# Patient Record
Sex: FEMALE | Race: AMERICAN INDIAN OR ALASKA NATIVE
[De-identification: names, ages, dates, MRNs, and addresses within clinical notes are randomized per-mention and may not be internally consistent; named-entity substitution may affect disease eponyms.]

---

## 2017-12-05 ENCOUNTER — HOSPITAL ENCOUNTER (OUTPATIENT)
Dept: HOSPITAL 5 - MAMMO | Age: 52
Discharge: HOME | End: 2017-12-05
Attending: SPECIALIST
Payer: COMMERCIAL

## 2017-12-05 DIAGNOSIS — Z12.31: Primary | ICD-10-CM

## 2017-12-05 PROCEDURE — 77067 SCR MAMMO BI INCL CAD: CPT

## 2017-12-05 PROCEDURE — G0202 SCR MAMMO BI INCL CAD: HCPCS

## 2017-12-05 NOTE — MAMMOGRAPHY REPORT
Bilateral digital screening mammogram with CAD. Comparison study is 

dated June 14, 2016.



Findings: There is heterogeneous density of the fibroglandular tissue. 

In the central left breast, there is an ill-defined new parenchymal 

asymmetry. No architectural distortion or suspicious calcifications are 

seen.



There is also a small central asymmetry in the right breast.



Impression: Bilateral asymmetries.



BI-RADS code: 0.



Recommendation: Spot compression images, 90 degree views, and 

ultrasound if needed.

## 2017-12-12 ENCOUNTER — HOSPITAL ENCOUNTER (OUTPATIENT)
Dept: HOSPITAL 5 - MAMMO | Age: 52
Discharge: HOME | End: 2017-12-12
Attending: SPECIALIST
Payer: COMMERCIAL

## 2017-12-12 DIAGNOSIS — N60.01: Primary | ICD-10-CM

## 2017-12-12 PROCEDURE — G0204 DX MAMMO INCL CAD BI: HCPCS

## 2017-12-12 PROCEDURE — 76642 ULTRASOUND BREAST LIMITED: CPT

## 2017-12-12 PROCEDURE — 77066 DX MAMMO INCL CAD BI: CPT

## 2017-12-12 NOTE — MAMMOGRAPHY REPORT
BILATERAL DIGITAL DIAGNOSTIC MAMMOGRAM and RIGHT BREAST ULTRASOUND: 

12/12/17 10:56:00



CLINICAL: Recalled for bilateral asymmetries.



COMPARISON:12/05/17 screening



FINDINGS: Bilateral true lateral and spot compression views were 

performed. Satisfactory effacement of left asymmetry and partial 

effacement of a right central asymmetry. The lateral views are 

negative. 



Ultrasound of the right breast was performed and demonstrated an 

elongated cyst at 12 o'clock 6 cm from the nipple measuring 8 x 2 x 2 

mm and a second benign cyst at 12 o'clock 6 cm from the nipple 

measuring 10 x 3 x 3 mm.  No solid mass or shadowing.



IMPRESSION: Small benign right breast cysts.  Negative left breast.



BI-RADS CATEGORY:  2 - - Benign



RECOMMENDATION: Routine mammographic screening in one year.



ACR BI-RADS MAMMOGRAPHIC CODES:

0 = Needs additional imaging evaluation; 1 = Negative; 2 = Benign; 3 = 

Probably benign; 4 = Suspicious; 5 = Malignant; 6 = Known biopsy-proven 

malignancy



COMMENT:

      1.   Dense breast tissue, i.e., adenosis, fibrocystic 

            changes, etc., may obscure an underlying neoplasm.

      2.   Approximately 10% of cancers are not detected with

            mammography.

      3.   A negative mammography report should not delay biopsy 

            if a clinically suspicious mass is present.





COMMENT:

Patient follow-up letters are generated via our [employer] 

application.

## 2019-02-05 ENCOUNTER — HOSPITAL ENCOUNTER (OUTPATIENT)
Dept: HOSPITAL 5 - MAMMO | Age: 54
Discharge: HOME | End: 2019-02-05
Attending: INTERNAL MEDICINE
Payer: COMMERCIAL

## 2019-02-05 DIAGNOSIS — Z12.31: Primary | ICD-10-CM

## 2019-02-05 DIAGNOSIS — I10: ICD-10-CM

## 2019-02-05 PROCEDURE — 77067 SCR MAMMO BI INCL CAD: CPT

## 2019-02-07 NOTE — MAMMOGRAPHY REPORT
BILATERAL DIGITAL SCREENING MAMMOGRAM with CAD: 02/05/19 09:06:00



CLINICAL: Routine screening.



COMPARISON:12/12/17



FINDINGS: There are scattered areas of fibroglandular density. No mass, 

architectural distortion or suspicious calcifications.



IMPRESSION: No mammographic evidence of malignancy.



BI-RADS CATEGORY:  2 -- Benign



RECOMMENDATION: Routine mammographic screening in one year.



COMMENT:

Patient follow-up letters are generated by our  [employer] application.

## 2019-03-25 NOTE — ULTRASOUND REPORT
BILATERAL DIGITAL DIAGNOSTIC MAMMOGRAM and RIGHT BREAST ULTRASOUND: 

12/12/17 10:56:00



CLINICAL: Recalled for bilateral asymmetries.



COMPARISON:12/05/17 screening



FINDINGS: Bilateral true lateral and spot compression views were 

performed. Satisfactory effacement of left asymmetry and partial 

effacement of a right central asymmetry. The lateral views are 

negative. 



Ultrasound of the right breast was performed and demonstrated an 

elongated cyst at 12 o'clock 6 cm from the nipple measuring 8 x 2 x 2 

mm and a second benign cyst at 12 o'clock 6 cm from the nipple 

measuring 10 x 3 x 3 mm.  No solid mass or shadowing.



IMPRESSION: Small benign right breast cysts.  Negative left breast.



BI-RADS CATEGORY:  2 - - Benign



RECOMMENDATION: Routine mammographic screening in one year.



ACR BI-RADS MAMMOGRAPHIC CODES:

0 = Needs additional imaging evaluation; 1 = Negative; 2 = Benign; 3 = 

Probably benign; 4 = Suspicious; 5 = Malignant; 6 = Known biopsy-proven 

malignancy



COMMENT:

      1.   Dense breast tissue, i.e., adenosis, fibrocystic 

            changes, etc., may obscure an underlying neoplasm.

      2.   Approximately 10% of cancers are not detected with

            mammography.

      3.   A negative mammography report should not delay biopsy 

            if a clinically suspicious mass is present.





COMMENT:

Patient follow-up letters are generated via our [employer] 

application.
Implemented All Universal Safety Interventions:  [place] to call system. Call bell, personal items and telephone within reach. Instruct patient to call for assistance. Room bathroom lighting operational. Non-slip footwear when patient is off stretcher. Physically safe environment: no spills, clutter or unnecessary equipment. Stretcher in lowest position, wheels locked, appropriate side rails in place.

## 2021-05-18 ENCOUNTER — HOSPITAL ENCOUNTER (OUTPATIENT)
Dept: HOSPITAL 5 - MAMMO | Age: 56
Discharge: HOME | End: 2021-05-18
Attending: SPECIALIST
Payer: COMMERCIAL

## 2021-05-18 DIAGNOSIS — N64.89: ICD-10-CM

## 2021-05-18 DIAGNOSIS — Z12.31: Primary | ICD-10-CM

## 2021-05-18 PROCEDURE — 77067 SCR MAMMO BI INCL CAD: CPT

## 2021-05-18 NOTE — MAMMOGRAPHY REPORT
BILATERAL DIGITAL SCREENING MAMMOGRAM WITH CAD 

 

HISTORY: Screening mammogram.



TECHNIQUE:  Routine digital mammographic imaging performed.  This examination was interpreted with bailee carranza benefit of Computer-aided Detection analysis.



COMPARISON: 2/5/2019, 12/12/2017, 12/5/2017, 6/14/2016.



FINDINGS: 



Breast Density: scattered fibroglandular appearance of the breast tissue.



Digital CC and MLO views demonstrate a focal asymmetry in the left slightly inferior central breast a
s well as a left central breast asymmetry which is seen on the MLO view only.  No suspicious findings
 within the right breast.





IMPRESSION:



Left slightly inferior central breast focal asymmetry and single view left central breast asymmetry. 
 Additional mammographic views and possible ultrasound is recommended.



BIRADS 0-Incomplete:  Needs additional imaging evaluation



NOTE:  WE WILL RECALL THE PATIENT FOR THIS ADDITIONAL EVALUATION.





FURTHER INFORMATION:  According to the [employer], yearly mammograms are recommend
ed starting at age 40 and continuing as long as a woman is in good health. Clinical Breast Exams shou
ld be part of a periodic health exam-about every 3 years for women in their 20s and 30s and every yea
r for women 40 and over. Breast self exam is an option for women starting in their 20s. Any breast ch
swetha noted on a breast self exam should be reported promptly to the patient's healthcare provider. Br
east MRI is recommended for women with an approximately 20-25% or greater lifetime risk of breast can
cer, including women with a strong family history of breast or ovarian cancer and women who have been
 treated for Hodgkin's disease.



A negative Mammography report should not discourage follow up or biopsy of a clinically significant f
inding and/or abnormality.



Dense breast tissue may obscure small neoplasms.  



The patient will be entered into a reminder system with a target due date for the next screening mamm
ogram.



Signer Name: Francesco Dumont MD 

Signed: 5/18/2021 2:23 PM

Workstation Name: [id number]

## 2021-05-25 ENCOUNTER — HOSPITAL ENCOUNTER (OUTPATIENT)
Dept: HOSPITAL 5 - MAMMO | Age: 56
Discharge: HOME | End: 2021-05-25
Attending: SPECIALIST
Payer: COMMERCIAL

## 2021-05-25 DIAGNOSIS — N60.02: ICD-10-CM

## 2021-05-25 DIAGNOSIS — N63.20: Primary | ICD-10-CM

## 2021-05-25 NOTE — MAMMOGRAPHY REPORT
BILATERAL DIGITAL DIAGNOSTIC MAMMOGRAM WITH CAD CONVENTIONAL, 5/25/2021

LEFT LIMITED BREAST ULTRASOUND

 

CLINICAL INFORMATION / INDICATION: Follow-up of left breast asymmetry seen on recent screening mammog
janae.



TECHNIQUE: Digital left mammographic imaging was performed. Spot compression views were obtained. Vora
ited ultrasound was performed. This examination was interpreted with the benefit of Computer-Aided De
tection (CAD) analysis. 



COMPARISON: Screening mammograms from 5/18/2021 and 2/5/2019. Diagnostic bilateral mammogram from 12/
12/2017.



FINDINGS: 



Breast Density: There are scattered areas of fibroglandular density.



MAMMOGRAPHIC FINDINGS: The previously described slightly inferior central focal asymmetry and central
 asymmetry are again seen on spot compression views without significant interval changes. No other si
gnificant abnormality is identified.



ULTRASOUND FINDINGS: Targeted ultrasound evaluation was performed of the area of interest.   In the 6
:00 position is a lobulated complicated cyst measuring 4.7 x 3.1 x 3.5 mm that may correlate with the
 more central previously described asymmetry. No sonographic correlate with the more inferiorly locat
ed left breast focal asymmetry is identified. No other significant abnormality is seen.





IMPRESSION: Persistent nodularity in the left breast without a suspicious sonographic correlate is fa
vored to be benign. Continued annual screening mammography is recommended.



Follow up recommendation: Routine yearly



BI-RADS Category 2:  Benign.



-------------------------------------------------------------------------------------------

A "normal" or negative report should not discourage follow up or biopsy of a clinically significant f
inding.



A written summary of these findings will be mailed to the patient. The patient will be entered into a
 mammography reporting system which will generate a reminder letter for the patient's next appointmen
t at the appropriate interval.



According to the American College [employer], yearly mammograms are recommended starting at age 40 
and continuing as long as a woman is in good health.  Breast MRI is recommended for women with an ilir
roximately 20-25% or greater lifetime risk of breast cancer, including women with a strong family his
tory of breast or ovarian cancer and women who have been treated for Hodgkin's disease.



Signer Name: Abner Bernal MD 

Signed: 5/25/2021 11:06 AM

Workstation Name: [id number]

## 2021-05-25 NOTE — ULTRASOUND REPORT
BILATERAL DIGITAL DIAGNOSTIC MAMMOGRAM WITH CAD CONVENTIONAL, 5/25/2021

LEFT LIMITED BREAST ULTRASOUND

 

CLINICAL INFORMATION / INDICATION: Follow-up of left breast asymmetry seen on recent screening mammog
janae.



TECHNIQUE: Digital left mammographic imaging was performed. Spot compression views were obtained. Vora
ited ultrasound was performed. This examination was interpreted with the benefit of Computer-Aided De
tection (CAD) analysis. 



COMPARISON: Screening mammograms from 5/18/2021 and 2/5/2019. Diagnostic bilateral mammogram from 12/
12/2017.



FINDINGS: 



Breast Density: There are scattered areas of fibroglandular density.



MAMMOGRAPHIC FINDINGS: The previously described slightly inferior central focal asymmetry and central
 asymmetry are again seen on spot compression views without significant interval changes. No other si
gnificant abnormality is identified.



ULTRASOUND FINDINGS: Targeted ultrasound evaluation was performed of the area of interest.   In the 6
:00 position is a lobulated complicated cyst measuring 4.7 x 3.1 x 3.5 mm that may correlate with the
 more central previously described asymmetry. No sonographic correlate with the more inferiorly locat
ed left breast focal asymmetry is identified. No other significant abnormality is seen.





IMPRESSION: Persistent nodularity in the left breast without a suspicious sonographic correlate is fa
vored to be benign. Continued annual screening mammography is recommended.



Follow up recommendation: Routine yearly



BI-RADS Category 2:  Benign.



-------------------------------------------------------------------------------------------

A "normal" or negative report should not discourage follow up or biopsy of a clinically significant f
inding.



A written summary of these findings will be mailed to the patient. The patient will be entered into a
 mammography reporting system which will generate a reminder letter for the patient's next appointmen
t at the appropriate interval.



According to the [employer], yearly mammograms are recommended starting at age 40 
and continuing as long as a woman is in good health.  Breast MRI is recommended for women with an ilir
roximately 20-25% or greater lifetime risk of breast cancer, including women with a strong family his
tory of breast or ovarian cancer and women who have been treated for Hodgkin's disease.



Signer Name: Abner Bernal MD 

Signed: 5/25/2021 11:06 AM

Workstation Name: [id number]